# Patient Record
(demographics unavailable — no encounter records)

---

## 2025-02-26 NOTE — REASON FOR VISIT
[Annual] : an annual visit. Double O-Z Plasty Text: The defect edges were debeveled with a #15 scalpel blade.  Given the location of the defect, shape of the defect and the proximity to free margins a Double O-Z plasty (double transposition flap) was deemed most appropriate.  Using a sterile surgical marker, the appropriate transposition flaps were drawn incorporating the defect and placing the expected incisions within the relaxed skin tension lines where possible. The area thus outlined was incised deep to adipose tissue with a #15 scalpel blade.  The skin margins were undermined to an appropriate distance in all directions utilizing iris scissors.  Hemostasis was achieved with electrocautery.  The flaps were then transposed into place, one clockwise and the other counterclockwise, and anchored with interrupted buried subcutaneous sutures.

## 2025-03-03 NOTE — HISTORY OF PRESENT ILLNESS
[Patient reported colonoscopy was normal] : Patient reported colonoscopy was normal [Y] : Positive pregnancy history [No] : Patient does not have concerns regarding sex [Patient reported mammogram was normal] : Patient reported mammogram was normal [Patient reported breast sonogram was normal] : Patient reported breast sonogram was normal [LMP unknown] : LMP unknown [postmenopausal] : postmenopausal [N] : Patient does not use contraception [unknown] : Patient is unsure of the date of her LMP [Menopause Age: ____] : age at menopause was [unfilled] [Mammogramdate] : 12/2024 [BreastSonogramDate] : 12/2024 [PapSmeardate] : 6/1/2023 [TextBox_31] : NEG [ColonoscopyDate] : 2024 [HPVDate] : 6/1/2023 [TextBox_78] : NEG [PGHxTotal] : 3 [[facility]Term] : 3 [[facility] Centeriving] : 3 [FreeTextEntry1] :  X3

## 2025-03-03 NOTE — PHYSICAL EXAM
[Chaperone Present] : A chaperone was present in the examining room during all aspects of the physical examination [Appropriately responsive] : appropriately responsive [Alert] : alert [No Acute Distress] : no acute distress [No Lymphadenopathy] : no lymphadenopathy [Regular Rate Rhythm] : regular rate rhythm [No Murmurs] : no murmurs [Clear to Auscultation B/L] : clear to auscultation bilaterally [Soft] : soft [Non-tender] : non-tender [Non-distended] : non-distended [No HSM] : No HSM [No Lesions] : no lesions [No Mass] : no mass [Oriented x3] : oriented x3 [Examination Of The Breasts] : a normal appearance [No Masses] : no breast masses were palpable [Labia Majora] : normal [Labia Minora] : normal [Atrophy] : atrophy [Cervical Stenosis] : cervical stenosis [Normal] : normal [Dry Mucosa] : dry mucosa [Uterine Adnexae] : non-palpable [FreeTextEntry5] : very atrophic

## 2025-03-03 NOTE — PLAN
[FreeTextEntry1] : Pap done today given H/O LEEP. Prescription for mammogram screening and breast US given. Self-breast exam reviewed. She will follow up annually and as needed.

## 2025-03-03 NOTE — END OF VISIT
[FreeTextEntry3] : I, Tommy Spicer, solely acted as scribe for Dr. Molly Feliz on 02/26/2025.   All medical entries made by the Scribe were at my, Dr. Kiran, direction and personally dictated by me on 02/26/2025 . I have reviewed the chart and agree that the record accurately reflects my personal performance of the history, physical exam, assessment and plan. I have also personally directed, reviewed, and agreed with the chart.

## 2025-03-03 NOTE — HISTORY OF PRESENT ILLNESS
[Patient reported colonoscopy was normal] : Patient reported colonoscopy was normal [Y] : Positive pregnancy history [No] : Patient does not have concerns regarding sex [Patient reported mammogram was normal] : Patient reported mammogram was normal [Patient reported breast sonogram was normal] : Patient reported breast sonogram was normal [LMP unknown] : LMP unknown [postmenopausal] : postmenopausal [N] : Patient does not use contraception [unknown] : Patient is unsure of the date of her LMP [Menopause Age: ____] : age at menopause was [unfilled] [Mammogramdate] : 12/2024 [BreastSonogramDate] : 12/2024 [PapSmeardate] : 6/1/2023 [TextBox_31] : NEG [ColonoscopyDate] : 2024 [HPVDate] : 6/1/2023 [TextBox_78] : NEG [PGHxTotal] : 3 [[facility] MedicinelTerm] : 3 [[facility] Centeriving] : 3 [FreeTextEntry1] :  X3